# Patient Record
Sex: FEMALE | Race: WHITE | Employment: OTHER | URBAN - METROPOLITAN AREA
[De-identification: names, ages, dates, MRNs, and addresses within clinical notes are randomized per-mention and may not be internally consistent; named-entity substitution may affect disease eponyms.]

---

## 2017-05-25 PROBLEM — Z71.2 ENCOUNTER TO DISCUSS TEST RESULTS: Status: ACTIVE | Noted: 2017-05-25

## 2017-05-31 PROBLEM — Z51.11 ENCOUNTER FOR ANTINEOPLASTIC CHEMOTHERAPY: Status: ACTIVE | Noted: 2017-05-31

## 2017-07-17 PROBLEM — R30.0 DYSURIA: Status: ACTIVE | Noted: 2017-07-17

## 2017-07-24 PROBLEM — Z09 CHEMOTHERAPY FOLLOW-UP EXAMINATION: Status: ACTIVE | Noted: 2017-07-24

## 2018-09-26 PROBLEM — Z71.2 ENCOUNTER TO DISCUSS TEST RESULTS: Status: RESOLVED | Noted: 2017-05-25 | Resolved: 2018-09-26

## 2018-09-26 PROBLEM — Z51.11 ENCOUNTER FOR ANTINEOPLASTIC CHEMOTHERAPY: Status: RESOLVED | Noted: 2017-05-31 | Resolved: 2018-09-26

## 2018-09-26 PROBLEM — Z09 CHEMOTHERAPY FOLLOW-UP EXAMINATION: Status: RESOLVED | Noted: 2017-07-24 | Resolved: 2018-09-26

## 2024-08-17 ENCOUNTER — APPOINTMENT (OUTPATIENT)
Dept: GENERAL RADIOLOGY | Age: 79
End: 2024-08-17
Payer: MEDICARE

## 2024-08-17 ENCOUNTER — APPOINTMENT (OUTPATIENT)
Dept: CT IMAGING | Age: 79
End: 2024-08-17
Payer: MEDICARE

## 2024-08-17 ENCOUNTER — HOSPITAL ENCOUNTER (EMERGENCY)
Age: 79
Discharge: HOME | End: 2024-08-17
Attending: EMERGENCY MEDICINE
Payer: MEDICARE

## 2024-08-17 VITALS
HEIGHT: 64 IN | RESPIRATION RATE: 18 BRPM | DIASTOLIC BLOOD PRESSURE: 62 MMHG | WEIGHT: 135.14 LBS | TEMPERATURE: 97.7 F | OXYGEN SATURATION: 96 % | BODY MASS INDEX: 23.07 KG/M2 | HEART RATE: 81 BPM | SYSTOLIC BLOOD PRESSURE: 103 MMHG

## 2024-08-17 DIAGNOSIS — S09.90XA CLOSED HEAD INJURY, INITIAL ENCOUNTER: ICD-10-CM

## 2024-08-17 DIAGNOSIS — M79.622 LEFT UPPER ARM PAIN: ICD-10-CM
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
## 2024-08-17 PROCEDURE — 73030 X-RAY EXAM OF SHOULDER: CPT

## 2024-08-17 PROCEDURE — 99284 EMERGENCY DEPT VISIT MOD MDM: CPT

## 2024-08-17 PROCEDURE — 72125 CT NECK SPINE W/O DYE: CPT

## 2024-08-17 PROCEDURE — 70450 CT HEAD/BRAIN W/O DYE: CPT

## 2024-08-17 PROCEDURE — 6370000000 HC RX 637 (ALT 250 FOR IP): Performed by: EMERGENCY MEDICINE

## 2024-08-17 PROCEDURE — 73110 X-RAY EXAM OF WRIST: CPT

## 2024-08-17 PROCEDURE — 73080 X-RAY EXAM OF ELBOW: CPT

## 2024-08-17 RX ORDER — IBUPROFEN 400 MG/1
400 TABLET ORAL ONCE
Status: COMPLETED | OUTPATIENT
Start: 2024-08-17 | End: 2024-08-17

## 2024-08-17 RX ORDER — LIDOCAINE 50 MG/G
1 PATCH TOPICAL DAILY
Qty: 20 PATCH | Refills: 0 | Status: SHIPPED | OUTPATIENT
Start: 2024-08-17 | End: 2024-09-06

## 2024-08-17 RX ORDER — LIDOCAINE 4 G/G
1 PATCH TOPICAL ONCE
Status: DISCONTINUED | OUTPATIENT
Start: 2024-08-17 | End: 2024-08-17 | Stop reason: HOSPADM

## 2024-08-17 RX ADMIN — IBUPROFEN 400 MG: 400 TABLET, FILM COATED ORAL at 17:53

## 2024-08-17 ASSESSMENT — PAIN - FUNCTIONAL ASSESSMENT: PAIN_FUNCTIONAL_ASSESSMENT: 0-10

## 2024-08-17 ASSESSMENT — PAIN DESCRIPTION - PAIN TYPE: TYPE: ACUTE PAIN

## 2024-08-17 ASSESSMENT — PAIN SCALES - GENERAL
PAINLEVEL_OUTOF10: 4
PAINLEVEL_OUTOF10: 5

## 2024-08-17 ASSESSMENT — PAIN DESCRIPTION - ORIENTATION: ORIENTATION: LEFT

## 2024-08-17 ASSESSMENT — PAIN DESCRIPTION - LOCATION: LOCATION: ARM

## 2024-08-17 NOTE — ED NOTES
Patient ambulatory from ED. AVS provided and discussed with patient. All questions answered. Patient verbalizes understanding of discharge instructions. Respirations even and easy. No obvious distress at this time.    Patient states she was unable to reach family to take her home. States she intends to drive home. Patient declines offer of cab home.     Patient educated on application of sling and provided with sling to take home per DO order. Patient verbalizes understanding of education after demonstration.

## 2024-08-17 NOTE — ED TRIAGE NOTES
Patient presents to ED complaining of left upper arm pain. Patient reports tripping over a cord and falling at home this AM around 1000. Patient reports bracing herself with the left arm. Denies knonw previous fractures. Reports striking head, denies blood thinner use, denies headache, denies nausea or emesis, denies LOC.    Patient resting on bed, respirations even and easy at this time. Patient appears very uncomfortable, particularly when moving the affected extremity. No obvious deformity. CMS intact distal to injury.

## 2024-08-17 NOTE — ED NOTES
This RN to bedside to administer lidocaine patch as ordered. Patient not in room, currently in imaging.

## 2024-08-18 NOTE — ED PROVIDER NOTES
(5' 4\") 61.3 kg (135 lb 2.3 oz)           Nursing note and vitals reviewed.  Constitutional: Well developed, well nourished. Non-toxic in appearance.  HENT:      Head: Normocephalic.  Small frontal scalp contusion.  No raccoon eyes or Sahu sign.     Ears: External ears normal.      Nose: Nose normal.     Mouth: Membrane mucosa moist and pink.  Eyes: Anicteric sclera. No discharge.   Neck: Supple. Trachea midline.  No cervical midline tenderness.  Full range of motion without pain.  Cardiovascular: RRR; no murmurs, rubs, or gallops.  Left radial 2+.  Pulmonary/Chest: Effort normal. No respiratory distress. CTAB. No stridor. No wheezes. No rales.   Abdominal: Soft. No distension.   Musculoskeletal: Moves all extremities. No gross deformity.  Tenderness palpation of anterior left shoulder as well as distal humerus just proximal to elbow.  No overlying joint effusion or edema.  Minimal tenderness with palpation along left lateral epicondyle.  Mild tenderness with motion ranging of left wrist however no focal tenderness, no anatomical snuffbox tenderness.  Neurological: Alert and orientedx4. Face symmetric. Speech is clear.  5 out of 5 motor and sensation grossly intact bilateral upper extremities.  Skin: Warm and dry. No rash.  Psychiatric: Normal mood and affect. Behavior is normal.      Radiology  XR WRIST LEFT (MIN 3 VIEWS)   Final Result   1. LEFT SHOULDER: No acute osseous abnormality.   2. LEFT ELBOW: No acute osseous abnormality.   3. LEFT WRIST: Possible acute cortical break through the scaphoid waist;   correlate with physical findings (? snuffbox tenderness).   4. Soft tissue edema, presumably reactive edema, contusion and/or sprain.      RECOMMENDATION:   Note that if pain persists or worsens, then additional evaluation with   follow-up x-rays or MRI should be considered.         XR SHOULDER LEFT (MIN 2 VIEWS)   Final Result   1. LEFT SHOULDER: No acute osseous abnormality.   2. LEFT ELBOW: No acute osseous

## 2024-12-26 ENCOUNTER — HOSPITAL ENCOUNTER (EMERGENCY)
Age: 79
Discharge: HOME | End: 2024-12-26
Attending: STUDENT IN AN ORGANIZED HEALTH CARE EDUCATION/TRAINING PROGRAM
Payer: MEDICARE

## 2024-12-26 ENCOUNTER — APPOINTMENT (OUTPATIENT)
Dept: GENERAL RADIOLOGY | Age: 79
End: 2024-12-26
Payer: MEDICARE

## 2024-12-26 VITALS
DIASTOLIC BLOOD PRESSURE: 73 MMHG | HEART RATE: 78 BPM | WEIGHT: 125.66 LBS | TEMPERATURE: 98.3 F | RESPIRATION RATE: 18 BRPM | OXYGEN SATURATION: 97 % | SYSTOLIC BLOOD PRESSURE: 110 MMHG | BODY MASS INDEX: 21.57 KG/M2

## 2024-12-26 DIAGNOSIS — J18.9 PNEUMONIA OF LEFT LOWER LOBE DUE TO INFECTIOUS ORGANISM: Primary | ICD-10-CM

## 2024-12-26 PROCEDURE — 99283 EMERGENCY DEPT VISIT LOW MDM: CPT

## 2024-12-26 PROCEDURE — 71046 X-RAY EXAM CHEST 2 VIEWS: CPT

## 2024-12-26 RX ORDER — DOXYCYCLINE HYCLATE 100 MG
100 TABLET ORAL 2 TIMES DAILY
Qty: 14 TABLET | Refills: 0 | Status: SHIPPED | OUTPATIENT
Start: 2024-12-26 | End: 2025-01-02

## 2024-12-26 RX ORDER — METHYLPREDNISOLONE 4 MG/1
TABLET ORAL
Qty: 1 KIT | Refills: 0 | Status: SHIPPED | OUTPATIENT
Start: 2024-12-26 | End: 2025-01-01

## 2024-12-26 ASSESSMENT — PAIN SCALES - GENERAL: PAINLEVEL_OUTOF10: 2

## 2024-12-26 NOTE — DISCHARGE INSTRUCTIONS
You are seen today in the emergency department due to a cough and left-sided rib pain.  Your x-ray showed that you had a pneumonia on the left lung and antibiotics have been sent to your pharmacy, please take them as prescribed.  Please follow-up with your regular doctor within the next 48 hours.  Please return to the emergency department if you experience any further concerning symptoms.

## 2024-12-26 NOTE — ED TRIAGE NOTES
Patient presents to ED for c/o thinking she possible has pneumonia. Patient states she has had it 3x. Patient with c/o cough and pain to left side of her chest. Patient with slight SOB, hx of COPD and asthma. Patient denies fever, chest pain, N/V/D.

## 2024-12-26 NOTE — ED PROVIDER NOTES
Smoking status: Never   Vaping Use    Vaping status: Never Used   Substance Use Topics    Alcohol use: Not Currently    Drug use: Never       PHYSICAL EXAM       ED Triage Vitals [12/26/24 1045]   BP Systolic BP Percentile Diastolic BP Percentile Temp Temp Source Pulse Respirations SpO2   110/73 -- -- 98.3 °F (36.8 °C) Oral 78 18 97 %      Height Weight - Scale         -- 57 kg (125 lb 10.6 oz)               Physical Exam  Constitutional:       General: She is not in acute distress.     Appearance: Normal appearance.   HENT:      Head: Normocephalic and atraumatic.      Right Ear: External ear normal.      Left Ear: External ear normal.      Nose: Nose normal.      Mouth/Throat:      Mouth: Mucous membranes are moist.      Pharynx: Oropharynx is clear.   Cardiovascular:      Rate and Rhythm: Normal rate. Rhythm irregular.      Pulses: Normal pulses.      Heart sounds: Normal heart sounds.   Pulmonary:      Effort: Pulmonary effort is normal.      Breath sounds: Normal breath sounds.   Abdominal:      General: Abdomen is flat. There is no distension.      Palpations: Abdomen is soft.      Tenderness: There is no abdominal tenderness.   Musculoskeletal:         General: No tenderness, deformity or signs of injury. Normal range of motion.      Cervical back: Normal range of motion.   Skin:     General: Skin is warm and dry.      Capillary Refill: Capillary refill takes less than 2 seconds.   Neurological:      General: No focal deficit present.      Mental Status: She is alert and oriented to person, place, and time.      Sensory: No sensory deficit.      Motor: No weakness.   Psychiatric:         Mood and Affect: Mood normal.         Behavior: Behavior normal.           FORMAL DIAGNOSTIC RESULTS     EKG: All EKG's are interpreted by the Emergency Department Physician who either signs or Co-signs this chart in the absence of a cardiologist.            RADIOLOGY:   Non-plain film images such as CT, Ultrasound and MRI

## 2024-12-26 NOTE — ED NOTES
Provider order placed for patient's discharge. Provider reviewed decision to discharge with the patient. Discharge paperwork and any prescriptions were reviewed with the patient. Patient verbalized understanding of discharge education and any prescriptions and has no further questions or further needs at this time. Patient left with all personal belongings and was stable upon departure. Patient thanked for choosing [facility] and informed to return should any need arise.